# Patient Record
Sex: FEMALE | Race: BLACK OR AFRICAN AMERICAN
[De-identification: names, ages, dates, MRNs, and addresses within clinical notes are randomized per-mention and may not be internally consistent; named-entity substitution may affect disease eponyms.]

---

## 2016-11-21 VITALS — SYSTOLIC BLOOD PRESSURE: 162 MMHG | DIASTOLIC BLOOD PRESSURE: 74 MMHG

## 2017-11-27 ENCOUNTER — HOSPITAL ENCOUNTER (OUTPATIENT)
Dept: HOSPITAL 61 - MAMMO | Age: 82
Discharge: HOME | End: 2017-11-27
Attending: FAMILY MEDICINE
Payer: MEDICARE

## 2017-11-27 DIAGNOSIS — Z12.31: Primary | ICD-10-CM

## 2017-11-27 NOTE — RAD
DATE: 11/27/2017



EXAM: DIGITAL SCREEN BILAT W/CAD



HISTORY: Routine screening. Bilateral breast biopsy status post radiation in

2000 and 2009.



COMPARISON: Previous study from 11/20 1/16 and 9/24/2015



This study was interpreted with the benefit of Computerized Aided Detection

(CAD).



FINDINGS:



Breast Density: SCATTERED  The breast parenchyma shows scattered

fibroglandular densities. Breast parenchyma level B. 



Architectural distortion is seen in the posterior left breast at 12:00

position, stable from prior study. This most likely represents scar from

surgery.

Benign bilateral calcifications. No suspicious calcifications, new spiculated

mass or areas of architectural distortion.

Bilateral mild skin thickening may be secondary to radiation therapy.





IMPRESSION: No mammographic evidence of malignancy. Stable mammogram.







BI-RADS CATEGORY: 2 BENIGN FINDING(S)



RECOMMENDED FOLLOW-UP: 12M 12 MONTH FOLLOW-UP



PQRS compliance statement: Patient information was entered into a reminder

system with a target due date 11/27/2018 for the next mammogram.



Mammography is a sensitive method for finding small breast cancers, but it

does not detect them all and is not a substitute for careful clinical

examination.  A negative mammogram does not negate a clinically suspicious

finding and should not result in delay in biopsying a clinically suspicious

abnormality.



"Our facility is accredited by the [employer] Mammography

Program."

## 2018-02-16 ENCOUNTER — HOSPITAL ENCOUNTER (EMERGENCY)
Dept: HOSPITAL 61 - ER | Age: 83
Discharge: HOME | End: 2018-02-16
Payer: MEDICARE

## 2018-02-16 DIAGNOSIS — R10.2: ICD-10-CM

## 2018-02-16 DIAGNOSIS — K21.9: ICD-10-CM

## 2018-02-16 DIAGNOSIS — Z91.041: ICD-10-CM

## 2018-02-16 DIAGNOSIS — I10: ICD-10-CM

## 2018-02-16 DIAGNOSIS — B37.2: Primary | ICD-10-CM

## 2018-02-16 PROCEDURE — 99283 EMERGENCY DEPT VISIT LOW MDM: CPT

## 2018-03-24 ENCOUNTER — HOSPITAL ENCOUNTER (EMERGENCY)
Dept: HOSPITAL 61 - ER | Age: 83
Discharge: HOME | End: 2018-03-24
Payer: MEDICARE

## 2018-03-24 DIAGNOSIS — N39.0: ICD-10-CM

## 2018-03-24 DIAGNOSIS — I10: ICD-10-CM

## 2018-03-24 DIAGNOSIS — M54.2: ICD-10-CM

## 2018-03-24 DIAGNOSIS — M62.838: Primary | ICD-10-CM

## 2018-03-24 DIAGNOSIS — Z90.49: ICD-10-CM

## 2018-03-24 DIAGNOSIS — K21.9: ICD-10-CM

## 2018-03-24 DIAGNOSIS — Z90.710: ICD-10-CM

## 2018-03-24 DIAGNOSIS — Z91.041: ICD-10-CM

## 2018-03-24 LAB
ADD MAN DIFF?: NO
ALBUMIN SERPL-MCNC: 3.5 G/DL (ref 3.4–5)
ALP SERPL-CCNC: 80 U/L (ref 46–116)
ALT (SGPT): 27 U/L (ref 14–59)
AMPHETAMINE/METHAMPHETAMINE: (no result)
ANION GAP SERPL CALC-SCNC: 5 MMOL/L (ref 6–14)
AST SERPL-CCNC: 26 U/L (ref 15–37)
BACTERIA,URINE: (no result) /HPF
BARBITURATES: (no result)
BASO #: 0 X10^3/UL (ref 0–0.2)
BASO %: 1 % (ref 0–3)
BENZODIAZEPINES: (no result)
BILIRUB DIRECT SERPL-MCNC: 0.1 MG/DL (ref 0–0.2)
BILIRUBIN,URINE: NEGATIVE
BLOOD UREA NITROGEN: 20 MG/DL (ref 7–20)
CALCIUM: 9.8 MG/DL (ref 8.5–10.1)
CANNABINOIDS: (no result)
CHLORIDE: 107 MMOL/L (ref 98–107)
CK SERPL-CCNC: 109 U/L (ref 26–192)
CKMB INDEX: 0.9 % (ref 0–4)
CKMB MASS: 1 NG/ML (ref 0–3.6)
CLARITY,URINE: CLEAR
CO2 SERPL-SCNC: 34 MMOL/L (ref 21–32)
COCAINE: (no result)
COLOR,URINE: YELLOW
CREAT SERPL-MCNC: 0.8 MG/DL (ref 0.6–1)
EOS #: 0 X10^3/UL (ref 0–0.7)
EOS %: 1 % (ref 0–3)
ETHANOL, URINE: (no result)
GFR SERPLBLD BASED ON 1.73 SQ M-ARVRAT: 83.1 ML/MIN
GLUCOSE SERPL-MCNC: 103 MG/DL (ref 70–99)
GLUCOSE,URINE: NEGATIVE MG/DL
HCG SERPL-ACNC: 7.9 X10^3/UL (ref 4–11)
HEMATOCRIT: 39.2 % (ref 36–47)
HEMOGLOBIN: 12.9 G/DL (ref 12–15.5)
INR: 1.1 (ref 0.8–1.1)
LIPASE: 64 U/L (ref 73–393)
LYMPH #: 1.8 X10^3/UL (ref 1–4.8)
LYMPH %: 22 % (ref 24–48)
MAGNESIUM: 2.1 MG/DL (ref 1.8–2.4)
MEAN CORPUSCULAR HEMOGLOBIN: 31 PG (ref 25–35)
MEAN CORPUSCULAR HGB CONC: 33 G/DL (ref 31–37)
MEAN CORPUSCULAR VOLUME: 94 FL (ref 79–100)
METHADONE: (no result)
MONO #: 0.6 X10^3/UL (ref 0–1.1)
MONO %: 7 % (ref 0–9)
NEUT #: 5.5 X10^3UL (ref 1.8–7.7)
NEUT %: 69 % (ref 31–73)
NITRITE,URINE: NEGATIVE
NT-PRO BNP: 125 PG/ML (ref 0–449)
OPIATES: (no result)
PH,URINE: 7
PHENCYCLIDINE: (no result)
PLATELET COUNT: 311 X10^3/UL (ref 140–400)
POTASSIUM SERPL-SCNC: 4.3 MMOL/L (ref 3.5–5.1)
PROTEIN,URINE: NEGATIVE MG/DL
PROTHROMBIN TIME PATIENT: 13.3 SEC (ref 11.7–14)
RBC,URINE: 0 /HPF (ref 0–2)
RED BLOOD COUNT: 4.16 X10^6/UL (ref 3.5–5.4)
RED CELL DISTRIBUTION WIDTH: 13.8 % (ref 11.5–14.5)
SODIUM: 146 MMOL/L (ref 136–145)
SPECIFIC GRAVITY,URINE: 1.02
SQUAMOUS EPITHELIAL CELL,UR: (no result) /LPF
THYROID STIM HORMONE (TSH): 0.52 UIU/ML (ref 0.36–3.74)
TOTAL BILIRUBIN: 0.3 MG/DL (ref 0.2–1)
TOTAL PROTEIN: 7.8 G/DL (ref 6.4–8.2)
TROPONINI: 0.02 NG/ML (ref 0–0.06)
UROBILINOGEN,URINE: 4 MG/DL
WBC,URINE: (no result) /HPF (ref 0–4)

## 2018-03-24 PROCEDURE — 80076 HEPATIC FUNCTION PANEL: CPT

## 2018-03-24 PROCEDURE — 99285 EMERGENCY DEPT VISIT HI MDM: CPT

## 2018-03-24 PROCEDURE — 83735 ASSAY OF MAGNESIUM: CPT

## 2018-03-24 PROCEDURE — 84443 ASSAY THYROID STIM HORMONE: CPT

## 2018-03-24 PROCEDURE — 36415 COLL VENOUS BLD VENIPUNCTURE: CPT

## 2018-03-24 PROCEDURE — 85610 PROTHROMBIN TIME: CPT

## 2018-03-24 PROCEDURE — 93005 ELECTROCARDIOGRAM TRACING: CPT

## 2018-03-24 PROCEDURE — 87086 URINE CULTURE/COLONY COUNT: CPT

## 2018-03-24 PROCEDURE — 84484 ASSAY OF TROPONIN QUANT: CPT

## 2018-03-24 PROCEDURE — 83880 ASSAY OF NATRIURETIC PEPTIDE: CPT

## 2018-03-24 PROCEDURE — 81001 URINALYSIS AUTO W/SCOPE: CPT

## 2018-03-24 PROCEDURE — 83690 ASSAY OF LIPASE: CPT

## 2018-03-24 PROCEDURE — 80307 DRUG TEST PRSMV CHEM ANLYZR: CPT

## 2018-03-24 PROCEDURE — 80048 BASIC METABOLIC PNL TOTAL CA: CPT

## 2018-03-24 PROCEDURE — 71045 X-RAY EXAM CHEST 1 VIEW: CPT

## 2018-03-24 PROCEDURE — 85025 COMPLETE CBC W/AUTO DIFF WBC: CPT

## 2018-03-24 PROCEDURE — 82553 CREATINE MB FRACTION: CPT

## 2018-04-28 ENCOUNTER — HOSPITAL ENCOUNTER (EMERGENCY)
Dept: HOSPITAL 61 - ER | Age: 83
Discharge: HOME | End: 2018-04-28
Payer: MEDICARE

## 2018-04-28 DIAGNOSIS — K21.9: ICD-10-CM

## 2018-04-28 DIAGNOSIS — T50.995A: Primary | ICD-10-CM
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
## 2018-04-28 DIAGNOSIS — I10: ICD-10-CM

## 2018-04-28 DIAGNOSIS — R25.1: ICD-10-CM

## 2018-04-28 PROCEDURE — 99283 EMERGENCY DEPT VISIT LOW MDM: CPT

## 2018-04-28 PROCEDURE — 93005 ELECTROCARDIOGRAM TRACING: CPT

## 2018-04-28 RX ADMIN — ONDANSETRON 1 MG: 4 TABLET, ORALLY DISINTEGRATING ORAL at 17:32

## 2018-11-12 ENCOUNTER — HOSPITAL ENCOUNTER (EMERGENCY)
Dept: HOSPITAL 61 - ER | Age: 83
Discharge: HOME | End: 2018-11-12
Payer: MEDICARE

## 2018-11-12 VITALS
DIASTOLIC BLOOD PRESSURE: 82 MMHG | SYSTOLIC BLOOD PRESSURE: 158 MMHG | SYSTOLIC BLOOD PRESSURE: 158 MMHG | SYSTOLIC BLOOD PRESSURE: 158 MMHG | DIASTOLIC BLOOD PRESSURE: 82 MMHG | DIASTOLIC BLOOD PRESSURE: 82 MMHG

## 2018-11-12 VITALS — BODY MASS INDEX: 22.49 KG/M2 | HEIGHT: 65 IN | WEIGHT: 135 LBS

## 2018-11-12 DIAGNOSIS — Z91.041: ICD-10-CM

## 2018-11-12 DIAGNOSIS — Z90.710: ICD-10-CM

## 2018-11-12 DIAGNOSIS — I10: ICD-10-CM

## 2018-11-12 DIAGNOSIS — N20.0: Primary | ICD-10-CM

## 2018-11-12 DIAGNOSIS — R19.7: ICD-10-CM

## 2018-11-12 DIAGNOSIS — R63.0: ICD-10-CM

## 2018-11-12 DIAGNOSIS — Z90.89: ICD-10-CM

## 2018-11-12 DIAGNOSIS — K21.9: ICD-10-CM

## 2018-11-12 DIAGNOSIS — R06.00: ICD-10-CM

## 2018-11-12 DIAGNOSIS — R53.1: ICD-10-CM

## 2018-11-12 LAB
ALBUMIN SERPL-MCNC: 4 G/DL (ref 3.4–5)
ALP SERPL-CCNC: 93 U/L (ref 46–116)
ALT SERPL-CCNC: 30 U/L (ref 14–59)
ANION GAP SERPL CALC-SCNC: 8 MMOL/L (ref 6–14)
APTT PPP: YELLOW S
AST SERPL-CCNC: 20 U/L (ref 15–37)
BACTERIA #/AREA URNS HPF: (no result) /HPF
BASOPHILS # BLD AUTO: 0 X10^3/UL (ref 0–0.2)
BASOPHILS NFR BLD: 1 % (ref 0–3)
BILIRUB DIRECT SERPL-MCNC: 0.2 MG/DL (ref 0–0.2)
BILIRUB SERPL-MCNC: 0.6 MG/DL (ref 0.2–1)
BILIRUB UR QL STRIP: NEGATIVE
BUN SERPL-MCNC: 26 MG/DL (ref 7–20)
CALCIUM SERPL-MCNC: 10.2 MG/DL (ref 8.5–10.1)
CHLORIDE SERPL-SCNC: 101 MMOL/L (ref 98–107)
CO2 SERPL-SCNC: 32 MMOL/L (ref 21–32)
CREAT SERPL-MCNC: 1 MG/DL (ref 0.6–1)
EOSINOPHIL NFR BLD: 0 % (ref 0–3)
EOSINOPHIL NFR BLD: 0 X10^3/UL (ref 0–0.7)
ERYTHROCYTE [DISTWIDTH] IN BLOOD BY AUTOMATED COUNT: 14 % (ref 11.5–14.5)
FIBRINOGEN PPP-MCNC: CLEAR MG/DL
GFR SERPLBLD BASED ON 1.73 SQ M-ARVRAT: 64.1 ML/MIN
GLUCOSE SERPL-MCNC: 88 MG/DL (ref 70–99)
HCT VFR BLD CALC: 41.1 % (ref 36–47)
HGB BLD-MCNC: 14.1 G/DL (ref 12–15.5)
INFLUENZA A PATIENT: NEGATIVE
INFLUENZA B PATIENT: NEGATIVE
LIPASE: 94 U/L (ref 73–393)
LYMPHOCYTES # BLD: 1.9 X10^3/UL (ref 1–4.8)
LYMPHOCYTES NFR BLD AUTO: 23 % (ref 24–48)
MCH RBC QN AUTO: 32 PG (ref 25–35)
MCHC RBC AUTO-ENTMCNC: 34 G/DL (ref 31–37)
MCV RBC AUTO: 93 FL (ref 79–100)
MONO #: 0.9 X10^3/UL (ref 0–1.1)
MONOCYTES NFR BLD: 10 % (ref 0–9)
NEUT #: 5.5 X10^3UL (ref 1.8–7.7)
NEUTROPHILS NFR BLD AUTO: 66 % (ref 31–73)
NITRITE UR QL STRIP: NEGATIVE
PH UR STRIP: 5.5 [PH]
PLATELET # BLD AUTO: 389 X10^3/UL (ref 140–400)
POTASSIUM SERPL-SCNC: 4.4 MMOL/L (ref 3.5–5.1)
PROT SERPL-MCNC: 8.4 G/DL (ref 6.4–8.2)
PROT UR STRIP-MCNC: NEGATIVE MG/DL
RBC # BLD AUTO: 4.41 X10^6/UL (ref 3.5–5.4)
RBC #/AREA URNS HPF: (no result) /HPF (ref 0–2)
SODIUM SERPL-SCNC: 141 MMOL/L (ref 136–145)
SQUAMOUS #/AREA URNS LPF: (no result) /LPF
UROBILINOGEN UR-MCNC: 1 MG/DL
WBC # BLD AUTO: 8.3 X10^3/UL (ref 4–11)
WBC #/AREA URNS HPF: (no result) /HPF (ref 0–4)

## 2018-11-12 PROCEDURE — 83880 ASSAY OF NATRIURETIC PEPTIDE: CPT

## 2018-11-12 PROCEDURE — 96361 HYDRATE IV INFUSION ADD-ON: CPT

## 2018-11-12 PROCEDURE — 80076 HEPATIC FUNCTION PANEL: CPT

## 2018-11-12 PROCEDURE — 87804 INFLUENZA ASSAY W/OPTIC: CPT

## 2018-11-12 PROCEDURE — 80048 BASIC METABOLIC PNL TOTAL CA: CPT

## 2018-11-12 PROCEDURE — 85025 COMPLETE CBC W/AUTO DIFF WBC: CPT

## 2018-11-12 PROCEDURE — 84484 ASSAY OF TROPONIN QUANT: CPT

## 2018-11-12 PROCEDURE — 93005 ELECTROCARDIOGRAM TRACING: CPT

## 2018-11-12 PROCEDURE — 74176 CT ABD & PELVIS W/O CONTRAST: CPT

## 2018-11-12 PROCEDURE — 81001 URINALYSIS AUTO W/SCOPE: CPT

## 2018-11-12 PROCEDURE — 99285 EMERGENCY DEPT VISIT HI MDM: CPT

## 2018-11-12 PROCEDURE — 71045 X-RAY EXAM CHEST 1 VIEW: CPT

## 2018-11-12 PROCEDURE — 96374 THER/PROPH/DIAG INJ IV PUSH: CPT

## 2018-11-12 PROCEDURE — 36415 COLL VENOUS BLD VENIPUNCTURE: CPT

## 2018-11-12 PROCEDURE — 51701 INSERT BLADDER CATHETER: CPT

## 2018-11-12 PROCEDURE — 83690 ASSAY OF LIPASE: CPT

## 2018-11-12 NOTE — RAD
PQRS Compliance statement:

 

One or more of the following individualized dose reduction techniques were

utilized for this examination:

1. Automated exposure control.

2. Adjustment of the mA and/or kV according to patient size.

3. Use of iterative reconstruction technique.

 

Indication:ABDOMINAL PAIN, NAUSEA, VOMITING, DIARRHEA, X 1 WEEK, H/O 

CONTRAST ALLERGY

 

TECHNIQUE: CT abdomen and pelvis without IV contrast with multiplanar 

reformats.

 

COMPARISON: 7/21/2016

 

FINDINGS:

Limited evaluation of solid abdominal and pelvic organs due to lack of IV 

contrast. Heart is normal in size. No pericardial or effusion. Clear lung 

bases. Noncontrast appearance of the liver, spleen, gallbladder, pancreas,

adrenals within normal limits. 1.4 cm nonobstructing stone in the lower 

pole collecting system of the right kidney. Multiple bilateral low 

attenuating lesions are seen in the kidneys, the largest in the inferior 

pole of the right kidney measuring 0.4 x 4.0 cm and in the left kidney 

measuring 3.7 x 3.3 cm. No enlarged retroperitoneal or pelvic adenopathy. 

No free pelvic fluid or ascites. Sigmoid and descending colon 

diverticulosis. No bowel obstruction. Normal appendix. Urinary bladder 

demonstrates no stones. Status post hysterectomy. No abdominal hernia. No 

pneumoperitoneum. No suspicious bony lesion. Multilevel degenerative disc 

disease is seen in the lumbar spine.

 

IMPRESSION:

Limited evaluation of solid abdominal and pelvic organs due to lack of IV 

contrast.

1. Nonobstructing right renal stone with multiple bilateral simple 

appearing renal cysts.

2. No bowel obstruction normal appendix.

 

Electronically signed by: Matias Nelson DO (11/12/2018 2:53 PM) [id number]

## 2018-11-12 NOTE — PHYS DOC
Past Medical History


Past Medical History:  Anxiety, Arthritis, Cancer, GERD, Hypertension, Pneumonia


Additional Past Medical Histor:  BREAST CA WITH CHEMO AND RADIATION, DJD


Past Surgical History:  Appendectomy, Hysterectomy, Other


Additional Past Surgical Histo:  bilateral lumpectomy, BLADDER SLING, CARDIAC 

CATH WITH NO STENTS


Alcohol Use:  None


Drug Use:  None





Adult General


Chief Complaint


Chief Complaint:  NAUSEA/VOMITING/DIARRHA





HPI


HPI





Patient is a 83  year old female who presents with generalized weakness.  No 

ill over the last 7 days. She complains of persistent nausea but no vomiting. 

She does have some diffuse abdominal pain. She has also had episodes of 

diarrhea described to be nonbloody. The patient states she has had problems 

ambulating around her house. She does normally live alone. She has not fallen. 

She also has had anorexia over the last 4-5 days. She denies urinary symptoms. 

No constipation.  Denies chest pain. She does feel that she is having dyspnea 

that is present both with exertion and at rest. No worsening orthopnea. Denies 

lower extremity edema.





Review of Systems


Review of Systems





Constitutional: Denies fever or chills


Eyes: Denies change in visual acuity


HENT: Denies nasal congestion 


Respiratory: Denies cough or shortness of breath 


Cardiovascular: No additional information not addressed in HPI 


GI: as documented above


: Denies dysuria or hematuria 


Musculoskeletal: Denies back pain 


Integument: Denies rash or skin lesions


Neurologic: Denies headache


Endocrine: Denies polyuria





All other systems were reviewed and found to be within normal limits, except as 

documented in this note.





Current Medications


Current Medications





Current Medications








 Medications


  (Trade)  Dose


 Ordered  Sig/[facility]  Start Time


 Stop Time Status Last Admin


Dose Admin


 


 Ondansetron HCl


  (Zofran)  4 mg  1X  ONCE  11/12/18 14:00


 11/12/18 14:01 DC 11/12/18 14:00


4 MG


 


 Sodium Chloride  500 ml @ 


 500 mls/hr  1X  ONCE  11/12/18 14:00


 11/12/18 14:59 DC 11/12/18 14:00


500 MLS/HR











Allergies


Allergies





Allergies








Coded Allergies Type Severity Reaction Last Updated Verified


 


  Iodinated Contrast- Oral and IV Dye Allergy Severe ITCHING AND SHORT OF 

BREATH 7/21/16 Yes


 


  iodine Allergy Severe ITCHING AND SHORTNESS OF BREATH 7/21/16 Yes











Physical Exam


Physical Exam





Constitutional: Well developed, well nourished, no acute distress, non-toxic 

appearance


HENT: Normocephalic, atraumatic, bilateral external ears normal, oropharynx 

moist


Eyes: PERRLA, EOMI, conjunctiva normal, no discharge 


Neck: Normal range of motion, no tenderness, supple 


Cardiovascular:Heart rate regular rhythm


Lungs & Thorax:  Bilateral breath sounds clear to auscultation


Abdomen: Bowel sounds normal, soft, diffusely tender to palpation with no 

guarding or rebound 


Skin: Warm, dry, no erythema 


Back: No CVA tenderness 


Extremities: No tenderness, no edema 


Neurologic: Alert and oriented X 3, normal motor function


Psychologic: Affect normal





Current Patient Data


Vital Signs





 Vital Signs








  Date Time  Temp Pulse Resp B/P (MAP) Pulse Ox O2 Delivery O2 Flow Rate FiO2


 


11/12/18 14:04  64 24 158/82 (107) 94   


 


11/12/18 11:59 98.6     Room Air  





 98.6       








Lab Values





 Laboratory Tests








Test


 11/12/18


11:53 11/12/18


12:26 11/12/18


12:45 11/12/18


13:05


 


White Blood Count


 8.3 x10^3/uL


(4.0-11.0) 


 


 





 


Red Blood Count


 4.41 x10^6/uL


(3.50-5.40) 


 


 





 


Hemoglobin


 14.1 g/dL


(12.0-15.5) 


 


 





 


Hematocrit


 41.1 %


(36.0-47.0) 


 


 





 


Mean Corpuscular Volume


 93 fL ()


 


 


 





 


Mean Corpuscular Hemoglobin 32 pg (25-35)     


 


Mean Corpuscular Hemoglobin


Concent 34 g/dL


(31-37) 


 


 





 


Red Cell Distribution Width


 14.0 %


(11.5-14.5) 


 


 





 


Platelet Count


 389 x10^3/uL


(140-400) 


 


 





 


Neutrophils (%) (Auto) 66 % (31-73)     


 


Lymphocytes (%) (Auto) 23 % (24-48)  L   


 


Monocytes (%) (Auto) 10 % (0-9)  H   


 


Eosinophils (%) (Auto) 0 % (0-3)     


 


Basophils (%) (Auto) 1 % (0-3)     


 


Neutrophils # (Auto)


 5.5 x10^3uL


(1.8-7.7) 


 


 





 


Lymphocytes # (Auto)


 1.9 x10^3/uL


(1.0-4.8) 


 


 





 


Monocytes # (Auto)


 0.9 x10^3/uL


(0.0-1.1) 


 


 





 


Eosinophils # (Auto)


 0.0 x10^3/uL


(0.0-0.7) 


 


 





 


Basophils # (Auto)


 0.0 x10^3/uL


(0.0-0.2) 


 


 





 


Influenza Type A Antigen


 


 Negative


(NEGATIVE) 


 





 


Influenza Type B Antigen


 


 Negative


(NEGATIVE) 


 





 


Sodium Level


 


 


 141 mmol/L


(136-145) 





 


Potassium Level


 


 


 4.4 mmol/L


(3.5-5.1) 





 


Chloride Level


 


 


 101 mmol/L


() 





 


Carbon Dioxide Level


 


 


 32 mmol/L


(21-32) 





 


Anion Gap   8 (6-14)   


 


Blood Urea Nitrogen


 


 


 26 mg/dL


(7-20)  H 





 


Creatinine


 


 


 1.0 mg/dL


(0.6-1.0) 





 


Estimated GFR


(Cockcroft-Gault) 


 


 64.1  


 





 


Glucose Level


 


 


 88 mg/dL


(70-99) 





 


Calcium Level


 


 


 10.2 mg/dL


(8.5-10.1)  H 





 


Total Bilirubin


 


 


 0.6 mg/dL


(0.2-1.0) 





 


Direct Bilirubin


 


 


 0.2 mg/dL


(0.0-0.2) 





 


Aspartate Amino Transferase


(AST) 


 


 20 U/L (15-37)


 





 


Alanine Aminotransferase (ALT)


 


 


 30 U/L (14-59)


 





 


Alkaline Phosphatase


 


 


 93 U/L


() 





 


Troponin I Quantitative


 


 


 < 0.017 ng/mL


(0.000-0.055) 





 


NT-Pro-B-Type Natriuretic


Peptide 


 


 280 pg/mL


(0-449) 





 


Total Protein


 


 


 8.4 g/dL


(6.4-8.2)  H 





 


Albumin


 


 


 4.0 g/dL


(3.4-5.0) 





 


Lipase


 


 


 94 U/L


() 





 


Urine Collection Type    U cath  


 


Urine Color    Yellow  


 


Urine Clarity    Clear  


 


Urine pH    5.5  


 


Urine Specific Gravity    1.025  


 


Urine Protein


 


 


 


 Negative mg/dL


(NEG-TRACE)


 


Urine Glucose (UA)


 


 


 


 Negative mg/dL


(NEG)


 


Urine Ketones (Stick)


 


 


 


 Negative mg/dL


(NEG)


 


Urine Blood


 


 


 


 Moderate (NEG)





 


Urine Nitrite


 


 


 


 Negative (NEG)





 


Urine Bilirubin


 


 


 


 Negative (NEG)





 


Urine Urobilinogen Dipstick


 


 


 


 1.0 mg/dL (0.2


mg/dL)


 


Urine Leukocyte Esterase


 


 


 


 Negative (NEG)





 


Urine RBC


 


 


 


 3-5 /HPF (0-2)





 


Urine WBC


 


 


 


 1-4 /HPF (0-4)





 


Urine Squamous Epithelial


Cells 


 


 


 Occ /LPF  





 


Urine Bacteria


 


 


 


 Few /HPF


(0-FEW)


 


Urine Mucus    Slight /LPF  





 Laboratory Tests


11/12/18 11:53








 Laboratory Tests


11/12/18 12:45














EKG


EKG


No STEMI


Interpretation Time:


12:15





Radiology/Procedures


Radiology/Procedures


PCXR:  no acute findings.





CT abd/pelvis:





FINDINGS:


Limited evaluation of solid abdominal and pelvic organs due to lack of IV 


contrast. Heart is normal in size. No pericardial or effusion. Clear lung 


bases. Noncontrast appearance of the liver, spleen, gallbladder, pancreas,


adrenals within normal limits. 1.4 cm nonobstructing stone in the lower 


pole collecting system of the right kidney. Multiple bilateral low 


attenuating lesions are seen in the kidneys, the largest in the inferior 


pole of the right kidney measuring 0.4 x 4.0 cm and in the left kidney 


measuring 3.7 x 3.3 cm. No enlarged retroperitoneal or pelvic adenopathy. 


No free pelvic fluid or ascites. Sigmoid and descending colon 


diverticulosis. No bowel obstruction. Normal appendix. Urinary bladder 


demonstrates no stones. Status post hysterectomy. No abdominal hernia. No 


pneumoperitoneum. No suspicious bony lesion. Multilevel degenerative disc 


disease is seen in the lumbar spine.


 


IMPRESSION:


Limited evaluation of solid abdominal and pelvic organs due to lack of IV 


contrast.


1. Nonobstructing right renal stone with multiple bilateral simple 


appearing renal cysts.


2. No bowel obstruction normal appendix.





Course & Med Decision Making


Course & Med Decision Making


Pertinent Labs and Imaging studies reviewed. (See chart for details)





Patient was evaluated in the emergency department for some nausea and 

generalized weakness at home. Her lab panel did not reveal acute cause for her 

symptoms. Her EKG was nonacute. She underwent CT scan of the abdomen pelvis 

without contrast due to a contrast allergy. This exam also did not reveal any 

acute explanation for her symptoms. Urine was not infected. Ultimately, the 

patient was unwilling to stay in the hospital. I discussed with her the option 

to be admitted for generalized weakness and to receive physical therapy 

consultation. The patient desired discharge home. The patient is competent to 

make that choice. I do not feel the hospital admission is imminently necessary. 

Prior to discharge, the patient was ambulated about the department and had a 

normal steady gait. She was accompanied this evening by her granddaughter. 

Patient does have follow-up already scheduled with her primary care physician 

but she is advised to contact that office tomorrow to inquire about a sooner 

appointment. Otherwise, return to the emergency department for any new or 

worsening symptoms. Follow precautions were also discussed with the patient 

prior to discharge home. In the ER, she received a small fluid bolus and a 

single dose of Zofran which did entirely relieve her symptoms. She is 

discharged home also with a prescription for some Zofran.





Dragon Disclaimer


Dragon Disclaimer


This electronic medical record was generated, in whole or in part, using a 

voice recognition dictation system.





Departure


Departure


Referrals:  


RADHA ANGUIANO MD (PCP)


Scripts


Ondansetron (ONDANSETRON ODT) 4 Mg Tab.rapdis


4 MG PO BID PRN for NAUSEA/VOMITING, #12 TAB


   Prov: YAKELIN PICKARD DO         11/12/18











YAKELIN PICKARD DO Nov 12, 2018 13:31

## 2018-11-12 NOTE — RAD
PROCEDURE: PORTABLE CHEST 1V

 

CLINICAL INDICATION: SHORTNESS OF BREATH

 

COMPARISON: 3/24/2018

 

FINDINGS:  

No pneumothorax identified. Cardiac and mediastinal contours unremarkable.

No pulmonary consolidation or acute airspace disease. No acute osseous 

abnormalities identified. 

 

IMPRESSION:

No pulmonary consolidation or acute airspace disease. 

 

 

 

Electronically signed by: Matias Nelson DO (11/12/2018 12:28 PM) [id number]

## 2018-12-18 ENCOUNTER — HOSPITAL ENCOUNTER (OUTPATIENT)
Dept: HOSPITAL 61 - MAMMO | Age: 83
Discharge: HOME | End: 2018-12-18
Attending: INTERNAL MEDICINE
Payer: MEDICARE

## 2018-12-18 DIAGNOSIS — Z12.31: Primary | ICD-10-CM

## 2018-12-18 PROCEDURE — 77067 SCR MAMMO BI INCL CAD: CPT

## 2018-12-19 NOTE — RAD
DATE: 12/18/2018 1:00 PM



EXAM: DIGITAL SCREEN BILAT W/CAD



HISTORY: routine screening evaluation. History of prior bilateral breast cancer



COMPARISON: Prior mammographic imaging dating back to 12/17/2007



Bilateral CC and MLO views of the breasts were performed. Bilateral breast 
tomosynthesis was performed in CC and MLO projections.



This study was interpreted with the benefit of Computerized Aided Detection (CAD
).



Breast Density: The breast parenchyma shows scattered fibroglandular densities. 
Breast parenchyma level B.



FINDINGS:

Architectural distortion in the 12:00 position within the left breast with 
associated dystrophic calcifications is grossly stable in appearance, agree 
from prior lumpectomy change. Benign calcifications are present. Dense 
asymmetric retroareolar right breast tissue is mildly nodular, grossly stable 
to the immediate prior examination however appears slightly more prominent when 
compared to 9/24/2015.

No suspicious masses, microcalcifications or new architectural distortion is 
present to suggest malignancy in either breast.



The visualized axillae are unremarkable. 



IMPRESSION: Right breast retroareolar focal asymmetry, findings for which 
additional imaging is advised. 



BI-RADS CATEGORY: 0 INCOMPLETE: NEEDS ADDITIONAL IMAGING EVALUATION AND/OR 
PRIOR MAMMOGRAMS FOR COMPARISON.



RECOMMENDED FOLLOW-UP: ADD ADDITIONAL IMAGING The patient will be contacted to 
return for additional imaging and a supplemental report will follow. 
Specifically, spot compression views in the retroareolar right breast in the CC 
and MLO projection is recommended with associated dedicated breast ultrasound.



PQRS compliance statement: Patient information was entered into a reminder 
system with a target due date immediate recall for the next mammogram.



Mammography is a sensitive method for finding small breast cancers, but it does 
not detect them all and is not a substitute for careful clinical examination. A 
negative mammogram does not negate a clinically suspicious finding and should 
not result in delay in biopsying a clinically suspicious abnormality.



"Our facility is accredited by the [employer] Mammography 
Program."
MTDD

## 2018-12-21 ENCOUNTER — HOSPITAL ENCOUNTER (OUTPATIENT)
Dept: HOSPITAL 61 - MAMMO | Age: 83
Discharge: HOME | End: 2018-12-21
Attending: INTERNAL MEDICINE
Payer: MEDICARE

## 2018-12-21 DIAGNOSIS — R92.8: Primary | ICD-10-CM

## 2018-12-21 DIAGNOSIS — R23.4: ICD-10-CM

## 2018-12-21 PROCEDURE — 77065 DX MAMMO INCL CAD UNI: CPT

## 2018-12-21 PROCEDURE — 76641 ULTRASOUND BREAST COMPLETE: CPT

## 2018-12-21 NOTE — RAD
DATE: 12/21/2018 3:15 PM



EXAM: DIGITAL DIAGNOSTIC RT, BREAST RIGHT



HISTORY:  further evaluation of a finding noted on her most recent screening

mammographic examination. On that examination a focal asymmetry was reported

within the right retroareolar breast



COMPARISON: Prior mammographic imaging 12/18/2018, 11/27/2017, 11/21/2016,

9/24/2015



Spot compression 2-D CC and MLO views of the right breast were performed in

the retroareolar region.



FINDINGS:

Spot compression views of the right breast redemonstrate the retroareolar soft

tissue prominence, in general mildly progressed compared to prior

examinations.  In addition skin thickening is also seen.  Therefore this was

further evaluated by dedicated breast ultrasound.



ULTRASOUND TECHNIQUE: High-resolution Grayscale and color Doppler sonographic

evaluation of the right breast was performed in the retroareolar region.



ULTRASOUND FINDINGS: In the retroareolar right breast grossly normal appearing

breast tissue is identified.  No discrete mass is seen.  The

nipple-retroareolar complex measures approximately 3 cm.



Skin thickening is seen.



IMPRESSION: No sonographic or mammographic evidence of malignancy. 



BI-RADS CATEGORY: 2 BENIGN FINDING(S)



RECOMMENDED FOLLOW-UP: 12M 12 MONTH FOLLOW-UP Annual screening mammography is

recommended, unless clinically indicated sooner based on symptoms or change in

physical exam.  In addition, recommend clinical evaluation of the right breast

skin thickening.  This may be due to progressive changes from prior radiation

therapy.



PQRS compliance statement: Patient information was entered into a reminder

system with a target due date 12/19/2019 for the next mammogram.



Mammography is a sensitive method for finding small breast cancers, but it

does not detect them all and is not a substitute for careful clinical

examination.  A negative mammogram does not negate a clinically suspicious

finding and should not result in delay in biopsying a clinically suspicious

abnormality.



"Our facility is accredited by the [employer] Mammography

Program."